# Patient Record
Sex: FEMALE | Race: ASIAN | NOT HISPANIC OR LATINO | URBAN - METROPOLITAN AREA
[De-identification: names, ages, dates, MRNs, and addresses within clinical notes are randomized per-mention and may not be internally consistent; named-entity substitution may affect disease eponyms.]

---

## 2021-05-24 ENCOUNTER — EMERGENCY (EMERGENCY)
Facility: HOSPITAL | Age: 21
LOS: 1 days | Discharge: HOME | End: 2021-05-24
Attending: EMERGENCY MEDICINE
Payer: MEDICAID

## 2021-05-24 VITALS
OXYGEN SATURATION: 98 % | HEART RATE: 78 BPM | RESPIRATION RATE: 18 BRPM | HEIGHT: 63 IN | DIASTOLIC BLOOD PRESSURE: 75 MMHG | SYSTOLIC BLOOD PRESSURE: 112 MMHG | WEIGHT: 123.68 LBS | TEMPERATURE: 98 F

## 2021-05-24 LAB — SARS-COV-2 RNA SPEC QL NAA+PROBE: SIGNIFICANT CHANGE UP

## 2021-05-24 PROCEDURE — 99283 EMERGENCY DEPT VISIT LOW MDM: CPT

## 2021-05-24 PROCEDURE — 87635 SARS-COV-2 COVID-19 AMP PRB: CPT

## 2021-05-24 PROCEDURE — 99284 EMERGENCY DEPT VISIT MOD MDM: CPT

## 2021-05-24 NOTE — ED ADULT TRIAGE NOTE - CHIEF COMPLAINT QUOTE
pt stated she tested negative for covid 2 days ago, compliant itchy hives on her face started 2 days ago. Denies having any problems breathing or swallowing.

## 2021-05-24 NOTE — ED PROVIDER NOTE - CLINICAL SUMMARY MEDICAL DECISION MAKING FREE TEXT BOX
21 y/o F patient with chief complaint of itching to face x2d. Patient did not tried medication for the symptoms. No worsening symptoms in the past. Patient wants a COVID test and has no symptoms. 21 y/o F patient with chief complaint of itching to face x2days. Patient did not try any medication for the symptoms. No worsening symptoms in the past. Patient wants a COVID test and has no symptoms. 21 y/o F patient with chief complaint of itching to face x2days. Patient did not try any medication for the symptoms. No worsening symptoms in the past 2 days. History and exam suggestive of mild dermatitis. Will treat with OTC Benadryl and PMD follow up. Patient wants a COVID test and has no symptoms.

## 2021-05-24 NOTE — ED PROVIDER NOTE - ATTENDING CONTRIBUTION TO CARE
21 y/o F patient with chief complaint of itching to face x2days. Patient did not try any medication for the symptoms. No worsening symptoms in the past 2 days. History and exam suggestive of mild dermatitis. Will treat with OTC Benadryl and PMD follow up. Patient wants a COVID test and has no symptom

## 2021-05-24 NOTE — ED PROVIDER NOTE - PATIENT PORTAL LINK FT
You can access the FollowMyHealth Patient Portal offered by [facility] by registering at the following website: [email or web address]. By joining [email or web address]s FollowMyHealth portal, you will also be able to view your health information using other applications (apps) compatible with our system.

## 2021-05-24 NOTE — ED PROVIDER NOTE - NSFOLLOWUPINSTRUCTIONS_ED_ALL_ED_FT
You can take over the counter Benadryl 25 mg orally (1 to 2 tablets) every 6 hours as needed for the allergic reaction. May cause sedation.  Wash face with cool water and pad dry.    Follow up with the primary care doctor in 2-3 days.  If symptoms don't resolve in 2-3 days you should follow up with the dermatologist.  If you experience any new or worsening symptoms or if you are concerned you can always come back to the emergency for a re-evaluation.

## 2021-05-24 NOTE — ED PROVIDER NOTE - OBJECTIVE STATEMENT
21 y/o F patient with no significant PMHx presents to the ED with c/o itching to the face and eye x2days. Patient reports that 2days ago, patient went to [place] for a volleyball tournament, used different soap and towel from the towel. Patient developed itching and redness around the eye. Patient denies any lips swelling, tongue swelling, rash in other parts, new cosmetic items, or any other complaints. 19 y/o F patient with no significant PMHx presents to the ED with c/o itching to the face and around the eyes x2days. Patient reports that 2days ago, patient went to [place] for a volleyball tournament, used different soap and towel from the towel. Patient developed itching and redness around the eyes. Patient denies any lips swelling, tongue swelling, rash in other parts, SOB, abdo pain, N/V, new cosmetic or hair products use, or any other complaints.

## 2021-05-24 NOTE — ED PROVIDER NOTE - CARE PLAN
Principal Discharge DX:	Allergic reaction, initial encounter  Secondary Diagnosis:	Encounter for laboratory testing for COVID-19 virus
